# Patient Record
Sex: MALE | Race: WHITE
[De-identification: names, ages, dates, MRNs, and addresses within clinical notes are randomized per-mention and may not be internally consistent; named-entity substitution may affect disease eponyms.]

---

## 2017-06-25 ENCOUNTER — HOSPITAL ENCOUNTER (EMERGENCY)
Dept: HOSPITAL 25 - UCEAST | Age: 62
Discharge: HOME | End: 2017-06-25
Payer: COMMERCIAL

## 2017-06-25 VITALS — DIASTOLIC BLOOD PRESSURE: 78 MMHG | SYSTOLIC BLOOD PRESSURE: 144 MMHG

## 2017-06-25 DIAGNOSIS — L02.414: ICD-10-CM

## 2017-06-25 DIAGNOSIS — Z87.891: ICD-10-CM

## 2017-06-25 DIAGNOSIS — L03.114: Primary | ICD-10-CM

## 2017-06-25 PROCEDURE — 87070 CULTURE OTHR SPECIMN AEROBIC: CPT

## 2017-06-25 PROCEDURE — 99212 OFFICE O/P EST SF 10 MIN: CPT

## 2017-06-25 PROCEDURE — 90471 IMMUNIZATION ADMIN: CPT

## 2017-06-25 PROCEDURE — 90715 TDAP VACCINE 7 YRS/> IM: CPT

## 2017-06-25 PROCEDURE — 87205 SMEAR GRAM STAIN: CPT

## 2017-06-25 PROCEDURE — G0463 HOSPITAL OUTPT CLINIC VISIT: HCPCS

## 2017-06-25 NOTE — UC
Skin Complaint HPI





- HPI Summary


HPI Summary: 





THREE DAYS AGO, BEGAN WITH SMALL PIMPLE ON LEFT FOREARM; AREA SPREADING AND 

FIRM. NO FEVER. HAD HX OF ENDOCARDITIS THREE YEARS AGO. 





- History of Current Complaint


Chief Complaint: UCSkin


Time Seen by Provider: 06/25/17 08:10


Stated Complaint: RED AREA ON ARM


Hx Obtained From: Patient


Onset/Duration: Gradual Onset, Lasting Days, Still Present


Skin Exposure Onset/Duration: Days Ago


Onset Severity: Mild


Current Severity: Mild


Location: Discrete - LEFT FOREARM


Character: Redness, Raised


Aggravating: Nothing


Alleviating: Nothing


Associated Signs & Symptoms: Positive: Rash, Tenderness


Related History: Possible Reaction to: Insect





- Allergy/Home Medications


Allergies/Adverse Reactions: 


 Allergies











Allergy/AdvReac Type Severity Reaction Status Date / Time


 


Penicillin V Allergy  Rash Verified 05/27/14 08:42


 


Shrimp Flavor Allergy  ITCHY Verified 05/27/14 08:42


 


raw cashews Allergy  Rash Uncoded 05/27/14 08:42














Review of Systems


Constitutional: Negative


Skin: Rash


Eyes: Negative


ENT: Negative


Respiratory: Negative


Cardiovascular: Negative


Gastrointestinal: Negative


Genitourinary: Negative


Motor: Negative


Neurovascular: Negative


Musculoskeletal: Negative


Neurological: Negative


Psychological: Negative


All Other Systems Reviewed And Are Negative: Yes





PMH/Surg Hx/FS Hx/Imm Hx


Previously Healthy: Yes





- Surgical History


Surgical History: Yes


Surgery Procedure, Year, and Place: APPENDECTOMY 1997, MITRAL VALVE 2004.  LT 

KNEE MENISCUS REPAIR 2010





- Family History


Known Family History: Positive: Cardiac Disease





- Social History


Occupation: Employed Full-time


Lives: With Family


Alcohol Use: None


Alcohol Amount: DRANK HEAVILY, PER PT, UNTIL DEC 2013


Substance Use Type: None


Smoking Status (MU): Former Smoker


Type: Cigarettes


Have You Smoked in the Last Year: No


When Did the Patient Quit Smoking/Using Tobacco: 2004





- Immunization History


Most Recent Influenza Vaccination: unknown


Most Recent Tetanus Shot: over 10 yrs ago


Most Recent Pneumonia Vaccination: unknown





Physical Exam


Triage Information Reviewed: Yes


Appearance: Well-Appearing, No Pain Distress, Well-Nourished


Vital Signs: 


 Initial Vital Signs











Temp  98 F   06/25/17 07:59


 


Pulse  59   06/25/17 07:59


 


Resp  16   06/25/17 07:59


 


BP  144/78   06/25/17 07:59


 


Pulse Ox  99   06/25/17 07:59











Vital Signs Reviewed: Yes


Eye Exam: Normal


ENT Exam: Normal


ENT: Positive: Normal ENT inspection, Hearing grossly normal, Pharynx normal, 

TMs normal


Dental Exam: Normal


Neck exam: Normal


Neck: Positive: Supple, Nontender


Respiratory Exam: Normal


Respiratory: Positive: Chest non-tender, Lungs clear, Normal breath sounds, No 

respiratory distress, No accessory muscle use


Cardiovascular Exam: Normal


Cardiovascular: Positive: RRR, No Murmur, Pulses Normal


Abdominal Exam: Normal


Musculoskeletal Exam: Normal


Neurological Exam: Normal


Psychological: Positive: Normal Response To Family


Skin: Positive: Other - 2CM X 2CM INDURATED ERRETHEMATOUS AREA ON LEFT FOREARM 

WITH SMALL 1MM X 1MM AREA OF FLUCTUANCE AND PURULENT DISCHARGE





Course/Dx





- Course


Course Of Treatment: PATIENT STATES THAT HE HAS TAKEN KEFLEX IN PAST WITHOUT 

ANY REACTION.





- Differential Diagnoses - Skin Complaint


Differential Diagnoses: Abscess, Cellulitis, Eczema, Impetigo





- Diagnoses


Provider Diagnoses: LEFT FOREARM CELLULITIS, ABCESS





Discharge





- Discharge Plan


Condition: Stable


Disposition: HOME


Prescriptions: 


Cephalexin CAP* [Keflex CAP*] 500 mg PO QID #40 cap


Patient Education Materials:  Cellulitis (ED), Abscess (ED)


Referrals: 


Sarah Bryant MD [Medical Doctor] -

## 2017-06-27 NOTE — ED
Course/Dx





- Course


Course Of Treatment: DAY TWO CX "NO ORGANISMS SEEN".  NO CHANGE IN TREATMENT





- Diagnoses


Provider Diagnoses: 


 Cellulitis

## 2018-03-23 ENCOUNTER — HOSPITAL ENCOUNTER (EMERGENCY)
Dept: HOSPITAL 25 - ED | Age: 63
LOS: 1 days | Discharge: HOME | End: 2018-03-24
Payer: COMMERCIAL

## 2018-03-23 DIAGNOSIS — M79.661: Primary | ICD-10-CM

## 2018-03-23 DIAGNOSIS — R60.0: ICD-10-CM

## 2018-03-23 PROCEDURE — 36415 COLL VENOUS BLD VENIPUNCTURE: CPT

## 2018-03-23 PROCEDURE — 80053 COMPREHEN METABOLIC PANEL: CPT

## 2018-03-23 PROCEDURE — 85730 THROMBOPLASTIN TIME PARTIAL: CPT

## 2018-03-23 PROCEDURE — 85379 FIBRIN DEGRADATION QUANT: CPT

## 2018-03-23 PROCEDURE — 85610 PROTHROMBIN TIME: CPT

## 2018-03-23 PROCEDURE — 85025 COMPLETE CBC W/AUTO DIFF WBC: CPT

## 2018-03-23 PROCEDURE — 99282 EMERGENCY DEPT VISIT SF MDM: CPT

## 2018-03-24 VITALS — DIASTOLIC BLOOD PRESSURE: 82 MMHG | SYSTOLIC BLOOD PRESSURE: 138 MMHG

## 2018-03-24 LAB
BASOPHILS # BLD AUTO: 0.1 10^3/UL (ref 0–0.2)
EOSINOPHIL # BLD AUTO: 0.6 10^3/UL (ref 0–0.6)
HCT VFR BLD AUTO: 38 % (ref 42–52)
HGB BLD-MCNC: 12.9 G/DL (ref 14–18)
INR PPP/BLD: 0.94 (ref 0.77–1.02)
LYMPHOCYTES # BLD AUTO: 2.1 10^3/UL (ref 1–4.8)
MCH RBC QN AUTO: 30 PG (ref 27–31)
MCHC RBC AUTO-ENTMCNC: 34 G/DL (ref 31–36)
MCV RBC AUTO: 87 FL (ref 80–94)
MONOCYTES # BLD AUTO: 0.7 10^3/UL (ref 0–0.8)
NEUTROPHILS # BLD AUTO: 6.1 10^3/UL (ref 1.5–7.7)
NRBC # BLD AUTO: 0 10^3/UL
NRBC BLD QL AUTO: 0
PLATELET # BLD AUTO: 215 10^3/UL (ref 150–450)
RBC # BLD AUTO: 4.37 10^6/UL (ref 4–5.4)
WBC # BLD AUTO: 9.6 10^3/UL (ref 3.5–10.8)

## 2018-03-24 NOTE — ED
Lower Extremity





- HPI Summary


HPI Summary: 


62-year-old male presents with right leg swelling and pain for the past week.  

He states he had normal ultrasound 8 days ago.  He denies any new injury.  He 

states he did hit his leg on ago in the same area by hitting it on a door.  He 

denies any fevers.  He denies any spreading redness.  He states he has a 

history of muscle cramps.  He states he was started on Lasix for the edema 

which was helping.  He states tonight the pain got suddenly worse.  He took 

some ibuprofen with minimal relief.  He denies any chest pressures or shortness 

of breath.  He is nonsmoker.  He does have a family history of blood clots.  He 

denies any recent travel or recent surgeries or history of malignancy.








- History of Current Complaint


Chief Complaint: EDExtremityLower


Stated Complaint: RT LEG PAIN AND SWOLLEN


Time Seen by Provider: 03/24/18 00:37


Pain Intensity: 6





- Allergies/Home Medications


Allergies/Adverse Reactions: 


 Allergies











Allergy/AdvReac Type Severity Reaction Status Date / Time


 


Penicillins Allergy  Rash Verified 03/23/18 22:17


 


shrimp Allergy  Itching Verified 03/23/18 22:17


 


raw cashews Allergy  Rash Uncoded 03/23/18 22:17














PMH/Surg Hx/FS Hx/Imm Hx


Endocrine/Hematology History: 


   Denies: Hx Diabetes, Hx Thyroid Disease


Cardiovascular History: Reports: Hx Congestive Heart Failure, Hx 

Hypercholesterolemia, Hx Hypertension, Hx Valvular Heart Disease - s/p mitral 

valve repair 2004, Other Cardiovascular Problems/Disorders - ENDOCARDITIS


   Denies: Hx Pacemaker/ICD


Respiratory History: Reports: Hx Sleep Apnea - new CPAP user, compliant 05/2014


   Denies: Hx Asthma, Hx Chronic Obstructive Pulmonary Disease (COPD)


GI History: Reports: Hx Gastroesophageal Reflux Disease, Other GI Disorders - 

former ETOH abuse-elevated LFTs


   Denies: Hx Ulcer


Musculoskeletal History: Reports: Hx Back Problems - recurrent lower back pain 

in the past, Hx Orthopedic Injury - (left) knee meniscus tear


Sensory History: Reports: Hx Contacts or Glasses


   Denies: Hx Hearing Aid


Opthamlomology History: Reports: Hx Contacts or Glasses


Psychiatric History: Reports: Hx Depression, Other Psychiatric Issues/Disorders 

- takes prozac


   Denies: Hx Panic Disorder





- Surgical History


Surgery Procedure, Year, and Place: APPENDECTOMY 1997, MITRAL VALVE 2004.  LT 

KNEE MENISCUS REPAIR 2010





- Immunization History


Date of Tetanus Vaccine: Unk


Date of Influenza Vaccine: None


Infectious Disease History: No


Infectious Disease History: 


   Denies: Hx Clostridium Difficile, Hx Hepatitis, Hx Human Immunodeficiency 

Virus (HIV), Hx of Known/Suspected MRSA, Hx Shingles, Hx Tuberculosis, Hx Known/

Suspected VRE, Hx Known/Suspected VRSA, History Other Infectious Disease, 

Traveled Outside the [place] in Last 30 Days





- Family History


Known Family History: Positive: Cardiac Disease





- Social History


Alcohol Use: None


Alcohol Amount: DRANK HEAVILY, PER PT, UNTIL DEC 2013


Substance Use Type: Reports: None


Smoking Status (MU): Former Smoker


Type: Cigarettes


Have You Smoked in the Last Year: No





Review of Systems


Negative: Fever


Negative: Chest Pain


Negative: Shortness Of Breath


Positive: Myalgia - left calf, Edema - left calf


All Other Systems Reviewed And Are Negative: Yes





Physical Exam


Triage Information Reviewed: Yes


Vital Signs On Initial Exam: 


 Initial Vitals











Temp Pulse Resp BP Pulse Ox


 


 97.6 F   69   18   184/77   96 


 


 03/23/18 22:13  03/23/18 22:13  03/23/18 22:13  03/23/18 22:13  03/23/18 22:13











Vital Signs Reviewed: Yes


Appearance: Positive: Well-Appearing


Skin: Positive: Warm, Dry


Head/Face: Positive: Normal Head/Face Inspection


Eyes: Positive: Normal, Conjunctiva Clear


Respiratory/Lung Sounds: Positive: Clear to Auscultation, Breath Sounds Present


Cardiovascular: Positive: Normal, RRR


Musculoskeletal: Positive: Strength/ROM Intact - right leg, Edema Right - calf, 

Other - Tenderness posterior aspect of right calf that feels harder than rest 

of leg, pulses, cap refill less than 2 seconds, sensation grossly intact.  

Negative: Jaky Sign Right


Neurological: Positive: Normal


Psychiatric: Positive: Normal





Diagnostics





- Vital Signs


 Vital Signs











  Temp Pulse Resp BP Pulse Ox


 


 03/24/18 01:00   55    96


 


 03/24/18 00:43   55    96


 


 03/24/18 00:42     126/76 


 


 03/23/18 22:13  97.6 F  69  18  184/77  96














- Laboratory


Lab Results: 


 Lab Results











  03/24/18 03/24/18 03/24/18 Range/Units





  00:58 00:58 00:58 


 


WBC   9.6   (3.5-10.8)  10^3/ul


 


RBC   4.37   (4.0-5.4)  10^6/ul


 


Hgb   12.9 L   (14.0-18.0)  g/dl


 


Hct   38 L   (42-52)  %


 


MCV   87   (80-94)  fL


 


MCH   30   (27-31)  pg


 


MCHC   34   (31-36)  g/dl


 


RDW   14   (10.5-15)  %


 


Plt Count   215   (150-450)  10^3/ul


 


MPV   7.4   (7.4-10.4)  um3


 


Neut % (Auto)   63.8   (38-83)  %


 


Lymph % (Auto)   21.6 L   (25-47)  %


 


Mono % (Auto)   7.3 H   (0-7)  %


 


Eos % (Auto)   6.4 H   (0-6)  %


 


Baso % (Auto)   0.9   (0-2)  %


 


Absolute Neuts (auto)   6.1   (1.5-7.7)  10^3/ul


 


Absolute Lymphs (auto)   2.1   (1.0-4.8)  10^3/ul


 


Absolute Monos (auto)   0.7   (0-0.8)  10^3/ul


 


Absolute Eos (auto)   0.6   (0-0.6)  10^3/ul


 


Absolute Basos (auto)   0.1   (0-0.2)  10^3/ul


 


Absolute Nucleated RBC   0   10^3/ul


 


Nucleated RBC %   0   


 


INR (Anticoag Therapy)  0.94    (0.77-1.02)  


 


APTT  28.9    (26.0-36.3)  seconds


 


D-Dimer, Quantitative  < 200    (Less Than 230)  ng/mL


 


Sodium    135  (133-145)  mmol/L


 


Potassium    3.9  (3.5-5.0)  mmol/L


 


Chloride    104  (101-111)  mmol/L


 


Carbon Dioxide    25  (22-32)  mmol/L


 


Anion Gap    6  (2-11)  mmol/L


 


BUN    18  (6-24)  mg/dL


 


Creatinine    0.86  (0.67-1.17)  mg/dL


 


Est GFR ( Amer)    116.3  (>60)  


 


Est GFR (Non-Af Amer)    90.4  (>60)  


 


BUN/Creatinine Ratio    20.9 H  (8-20)  


 


Glucose    106 H  ()  mg/dL


 


Calcium    9.0  (8.6-10.3)  mg/dL


 


Total Bilirubin    0.40  (0.2-1.0)  mg/dL


 


AST    22  (13-39)  U/L


 


ALT    25  (7-52)  U/L


 


Alkaline Phosphatase    65  ()  U/L


 


Total Protein    6.5  (6.4-8.9)  g/dL


 


Albumin    3.9  (3.2-5.2)  g/dL


 


Globulin    2.6  (2-4)  g/dL


 


Albumin/Globulin Ratio    1.5  (1-3)  











Result Diagrams: 


 03/24/18 00:58





 03/24/18 00:58


Lab Statement: Any lab studies that have been ordered have been reviewed, and 

results considered in the medical decision making process.





Lower Extremity Course/Dx





- Course


Course Of Treatment: 62-year-old male presents with right leg swelling and pain 

for the past week.  He states he had normal ultrasound 8 days ago.  He denies 

any new injury.  He states he did hit his leg on ago in the same area by 

hitting it on a door.  He denies any fevers.  He denies any spreading redness.  

He states he has a history of muscle cramps.  He states he was started on Lasix 

for the edema which was helping.  He states tonight the pain got suddenly 

worse.  He took some ibuprofen with minimal relief.  He denies any chest 

pressures or shortness of breath.  He is nonsmoker.  He does have a family 

history of blood clots.  He denies any recent travel or recent surgeries or 

history of malignancy. On exam tenderness of right calf.  Right is more 

edematous than left.  No infection seen.  here at nighttime and cannot get 

ultrasound so d-dimer was ordered.  D-dimer is normal.  Labs within normal 

limits.  We'll have follow-up with primary.  We'll have given referral to ortho 

as this could be muscle tear?  Patient understands agrees with plan.





- Diagnoses


Differential Diagnosis/HQI/PQRI: Positive: DVT, Sprain, Strain


Provider Diagnoses: 


 Right calf pain








Discharge





- Sign-Out/Discharge


Documenting (check all that apply): Discharge





- Discharge Plan


Condition: Good


Disposition: HOME


Patient Education Materials:  Leg Edema (ED)


Referrals: 


Sarah Bryant MD [Primary Care Provider] - 


ePña Mahajan MD [Medical Doctor] - 


Additional Instructions: 


Follow up with primary next week


Ice, elevate


Take ibuprofen every 6 hours


follow up with ortho


Return to ED if develop any new or worsening symptoms





- Billing Disposition and Condition


Condition: GOOD


Disposition: HOME